# Patient Record
Sex: MALE | Race: OTHER | HISPANIC OR LATINO | Employment: OTHER | URBAN - METROPOLITAN AREA
[De-identification: names, ages, dates, MRNs, and addresses within clinical notes are randomized per-mention and may not be internally consistent; named-entity substitution may affect disease eponyms.]

---

## 2020-10-08 ENCOUNTER — TRANSCRIBE ORDERS (OUTPATIENT)
Dept: ADMINISTRATIVE | Facility: HOSPITAL | Age: 30
End: 2020-10-08

## 2020-10-08 DIAGNOSIS — M85.671 CYST OF BONE OF RIGHT FOOT: Primary | ICD-10-CM

## 2020-10-08 DIAGNOSIS — S96.912A: ICD-10-CM

## 2020-10-13 ENCOUNTER — HOSPITAL ENCOUNTER (OUTPATIENT)
Dept: MRI IMAGING | Facility: HOSPITAL | Age: 30
Discharge: HOME | End: 2020-10-13
Attending: PODIATRIST
Payer: COMMERCIAL

## 2020-10-13 DIAGNOSIS — S96.912A: ICD-10-CM

## 2020-10-13 DIAGNOSIS — M85.671 CYST OF BONE OF RIGHT FOOT: ICD-10-CM

## 2020-10-13 PROCEDURE — 73718 MRI LOWER EXTREMITY W/O DYE: CPT

## 2020-10-13 PROCEDURE — G1004 CDSM NDSC: HCPCS

## 2020-10-27 VITALS — DIASTOLIC BLOOD PRESSURE: 83 MMHG | SYSTOLIC BLOOD PRESSURE: 133 MMHG | HEART RATE: 71 BPM | WEIGHT: 174 LBS

## 2020-10-27 DIAGNOSIS — M85.671 CYST OF BONE OF RIGHT FOOT: ICD-10-CM

## 2020-10-27 DIAGNOSIS — M79.672 PAIN IN LEFT FOOT: ICD-10-CM

## 2020-10-27 DIAGNOSIS — S97.82XA CRUSHING INJURY OF LEFT FOOT, INITIAL ENCOUNTER: ICD-10-CM

## 2020-10-27 DIAGNOSIS — S96.192A: Primary | ICD-10-CM

## 2020-10-27 DIAGNOSIS — S90.32XA HEMATOMA OF LEFT FOOT: ICD-10-CM

## 2020-10-27 PROCEDURE — 99243 OFF/OP CNSLTJ NEW/EST LOW 30: CPT | Performed by: ORTHOPAEDIC SURGERY

## 2021-01-05 ENCOUNTER — OFFICE VISIT (OUTPATIENT)
Dept: OBGYN CLINIC | Facility: CLINIC | Age: 31
End: 2021-01-05
Payer: COMMERCIAL

## 2021-01-05 ENCOUNTER — APPOINTMENT (OUTPATIENT)
Dept: RADIOLOGY | Facility: CLINIC | Age: 31
End: 2021-01-05
Payer: COMMERCIAL

## 2021-01-05 DIAGNOSIS — M25.512 ACUTE PAIN OF LEFT SHOULDER: ICD-10-CM

## 2021-01-05 DIAGNOSIS — M25.512 ACUTE PAIN OF LEFT SHOULDER: Primary | ICD-10-CM

## 2021-01-05 DIAGNOSIS — M75.22 BICEPS TENDINITIS OF LEFT UPPER EXTREMITY: ICD-10-CM

## 2021-01-05 PROCEDURE — 99213 OFFICE O/P EST LOW 20 MIN: CPT | Performed by: ORTHOPAEDIC SURGERY

## 2021-01-05 PROCEDURE — 73030 X-RAY EXAM OF SHOULDER: CPT

## 2021-01-05 RX ORDER — IBUPROFEN 800 MG/1
800 TABLET ORAL EVERY 8 HOURS PRN
Qty: 60 TABLET | Refills: 0 | Status: SHIPPED | OUTPATIENT
Start: 2021-01-05 | End: 2021-01-07

## 2021-01-05 NOTE — PROGRESS NOTES
Patient Name:  Denny Strong  MRN:  [id number]    Assessment & Plan     1  Acute pain of left shoulder  -     XR shoulder 2+ vw left; Future; Expected date: 01/05/2021  -     Ambulatory referral to Physical Therapy; Future  -     ibuprofen (MOTRIN) 800 mg tablet; Take 1 tablet (800 mg total) by mouth every 8 (eight) hours as needed for mild pain or moderate pain 4-5 days using above sig, then take as needed remainder of medication  -     MRI arthrogram left shoulder; Future; Expected date: 01/05/2021    2  Biceps tendinitis of left upper extremity  -     Ambulatory referral to Physical Therapy; Future  -     ibuprofen (MOTRIN) 800 mg tablet; Take 1 tablet (800 mg total) by mouth every 8 (eight) hours as needed for mild pain or moderate pain 4-5 days using above sig, then take as needed remainder of medication  -     MRI arthrogram left shoulder; Future; Expected date: 01/05/2021        Patient has several months of worsening left shoulder pain which is affecting his activities of daily lifting  Pain has worsened despite use of oral anti-inflammatories and activity modification  History and exam are consistent with biceps tendinitis and suggest possible SLAP tear  I have ordered an MRI arthrogram of the left shoulder to evaluate possible labral pathology due to the persistent worsening of his symptoms  I have also given him a prescription for ibuprofen and have recommend icing of the left shoulder and continued home exercises working on range of motion and strengthening  He should follow up in the office after the MRI for discussion of the results  Chief Complaint     Left shoulder pain    History of the Present Illness     Denny Strong is a 35 y o  male with left shoulder pain that started about 3 months ago  He is right-hand dominant    He states that 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 at which time he lost balance in the harness tugged on his arm putting it in an extended position  He began to have anterior shoulder pain after that  He has taken over-the-counter Motrin for the past several months with minimal relief  He complains of worsening pain with supination and forward flexion activities  He denies any discrete instability  He does report intermittent clicking in the shoulder which is painful  He states his pain has been worsening despite activity modification and oral analgesics and is affecting his activities of daily living  He denies any numbness or tingling  He is beginning a job as a  this Monday  No other new complaints  Review of Systems     Review of Systems   Constitutional: Negative for appetite change and unexpected weight change  HENT: Negative for congestion and trouble swallowing  Eyes: Negative for visual disturbance  Respiratory: Negative for cough and shortness of breath  Cardiovascular: Negative for chest pain and palpitations  Gastrointestinal: Negative for nausea and vomiting  Endocrine: Negative for cold intolerance and heat intolerance  Musculoskeletal: Negative for gait problem and myalgias  Skin: Negative for rash  Neurological: Negative for numbness  Physical Exam     There were no vitals taken for this visit  Active range of motion to 170 degrees forward flexion, 170 degrees abduction, 80 degrees external rotation, and internal rotation to T12  There is significant tenderness present over the proximal biceps tendon  Empty can testing is negative  There is normal strength with external rotation testing at the side  Belly press test is negative  Freedman test is negative  Darby's test is equivocal   Speed's test is positive  There is a negative cross-arm adduction test   There is pain with dynamic shear testing     No apprehension present with abduction and external rotation to 90°    Patient has guarding with load and shift maneuver though there is no discrete instability appreciated  The patient is neurovascularly intact distally in the extremity  Eyes:  Anicteric sclerae  Neck:  Supple  Lungs:  Normal respiratory effort  Cardiovascular:  Capillary refill is less than 2 seconds  Skin:  Intact without erythema  Neurologic:  Sensation grossly intact to light touch  Psychiatric:  Mood and affect are appropriate  Data Review     I have personally reviewed pertinent films in PACS, and my interpretation follows:    X-rays left shoulder show no fracture dislocation  Glenohumeral joint space is well maintained  No past medical history on file  No past surgical history on file  No Known Allergies    No current outpatient medications on file prior to visit  No current facility-administered medications on file prior to visit  Social History     Tobacco Use    Smoking status: Never Smoker    Smokeless tobacco: Never Used   Substance Use Topics    Alcohol use: Not on file    Drug use: Not on file       No family history on file            Procedures Performed     Procedures        Ernesto Benjamin DO

## 2021-01-06 ENCOUNTER — TRANSCRIBE ORDERS (OUTPATIENT)
Dept: ADMINISTRATIVE | Facility: HOSPITAL | Age: 31
End: 2021-01-06

## 2021-01-06 DIAGNOSIS — M75.22 BICIPITAL TENDINITIS, LEFT SHOULDER: Primary | ICD-10-CM

## 2021-01-06 DIAGNOSIS — M25.512 PAIN IN LEFT SHOULDER: ICD-10-CM

## 2021-01-07 ENCOUNTER — OFFICE VISIT (OUTPATIENT)
Dept: OBGYN CLINIC | Facility: CLINIC | Age: 31
End: 2021-01-07
Payer: COMMERCIAL

## 2021-01-07 VITALS
DIASTOLIC BLOOD PRESSURE: 95 MMHG | SYSTOLIC BLOOD PRESSURE: 145 MMHG | HEIGHT: 69 IN | WEIGHT: 181 LBS | HEART RATE: 74 BPM | BODY MASS INDEX: 26.81 KG/M2

## 2021-01-07 DIAGNOSIS — M79.645 FINGER PAIN, LEFT: Primary | ICD-10-CM

## 2021-01-07 PROCEDURE — 20600 DRAIN/INJ JOINT/BURSA W/O US: CPT | Performed by: ORTHOPAEDIC SURGERY

## 2021-01-07 PROCEDURE — 99213 OFFICE O/P EST LOW 20 MIN: CPT | Performed by: ORTHOPAEDIC SURGERY

## 2021-01-07 NOTE — PROGRESS NOTES
CHIEF COMPLAINT:  Chief Complaint   Patient presents with    Left Index Finger - Pain, Clicking, Locking       SUBJECTIVE:  Rani Iqbal is a 35y o  year old  male who presents to the office for evaluation of his left index finger  Pt states that he has been having pain at the RCL at the MCP joint of his left index finger for about 3 weeks  Pt states that he has applied ice without relief  PAST MEDICAL HISTORY:  History reviewed  No pertinent past medical history  PAST SURGICAL HISTORY:  History reviewed  No pertinent surgical history  FAMILY HISTORY:  History reviewed  No pertinent family history  SOCIAL HISTORY:  Social History     Tobacco Use    Smoking status: Never Smoker    Smokeless tobacco: Never Used   Substance Use Topics    Alcohol use: Not on file    Drug use: Not on file       MEDICATIONS:    Current Outpatient Medications:     Acetaminophen (TYLENOL PO), Take by mouth, Disp: , Rfl:     ALLERGIES:  No Known Allergies    REVIEW OF SYSTEMS:  Review of Systems   Constitutional: Negative for chills, fever and unexpected weight change  HENT: Negative for hearing loss, nosebleeds and sore throat  Eyes: Negative for pain, redness and visual disturbance  Respiratory: Negative for cough, shortness of breath and wheezing  Cardiovascular: Negative for chest pain, palpitations and leg swelling  Gastrointestinal: Negative for abdominal pain, nausea and vomiting  Endocrine: Negative for polydipsia and polyuria  Genitourinary: Negative for dysuria and hematuria  Skin: Negative for rash and wound  Neurological: Negative for dizziness, light-headedness and headaches  Psychiatric/Behavioral: Negative for decreased concentration, dysphoric mood and suicidal ideas  The patient is not nervous/anxious          VITALS:  Vitals:    01/07/21 0821   BP: 145/95   Pulse: 74       LABS:  HgA1c: No results found for: HGBA1C  BMP: No results found for: GLUCOSE, CALCIUM, NA, K, CO2, CL, BUN, CREATININE    _____________________________________________________  PHYSICAL EXAMINATION:  General: well developed and well nourished, alert, oriented times 3 and appears comfortable  Psychiatric: Normal  HEENT: Trachea Midline, No torticollis  Pulmonary: No audible wheezing or strider  Cardiovascular: No discernable arrhythmia   Skin: No masses, erythema, lacerations, fluctation, ulcerations  Neurovascular: Sensation Intact to the Median, Ulnar, Radial Nerve, Motor Intact to the Median, Ulnar, Radial Nerve and Pulses Intact    MUSCULOSKELETAL EXAMINATION:  Left index finger   Tender palpation over the radial collateral ligament  Pain with UCL stressing  No laxity with RCL and UCL testing        ___________________________________________________  STUDIES REVIEWED:  No studies reviewed  PROCEDURES PERFORMED:  Small joint arthrocentesis: L index MCP  Universal Protocol:  Consent: Verbal consent obtained  Risks and benefits: risks, benefits and alternatives were discussed  Consent given by: patient  Patient understanding: patient states understanding of the procedure being performed  Patient consent: the patient's understanding of the procedure matches consent given  Site marked: the operative site was marked  Supporting Documentation  Indications: pain   Procedure Details  Location: index finger - L index MCP (RCL origin )  Preparation: Patient was prepped and draped in the usual sterile fashion  Ultrasound guidance: no              _____________________________________________________  ASSESSMENT/PLAN:    Left index finger capsulitis   * CSI was offered and accepted   * CSI was administered at the RCL origin without complications  * Pt will follow up in the office in 2 weeks for reevaluation         Follow Up:  No follow-ups on file        To Do Next Visit:  Re-evaluation of current issue          Scribe Attestation    I,:  Lillian Dailey am acting as a scribe while in the presence of the attending physician :       I,:  Starla Tavares MD personally performed the services described in this documentation    as scribed in my presence :

## 2021-01-18 ENCOUNTER — EVALUATION (OUTPATIENT)
Dept: PHYSICAL THERAPY | Facility: CLINIC | Age: 31
End: 2021-01-18
Payer: COMMERCIAL

## 2021-01-18 DIAGNOSIS — M75.22 BICEPS TENDINITIS OF LEFT UPPER EXTREMITY: ICD-10-CM

## 2021-01-18 DIAGNOSIS — M25.512 ACUTE PAIN OF LEFT SHOULDER: ICD-10-CM

## 2021-01-18 PROCEDURE — 97535 SELF CARE MNGMENT TRAINING: CPT

## 2021-01-18 PROCEDURE — 97162 PT EVAL MOD COMPLEX 30 MIN: CPT

## 2021-01-18 NOTE — PROGRESS NOTES
PT Evaluation     Today's date: 2021  Patient name: Cecilio Stapleton  : 1990  MRN: [id number]  Referring provider: Geovanna Hurst PA-C  Dx:   Encounter Diagnosis     ICD-10-CM    1  Acute pain of left shoulder  M25 512 Ambulatory referral to Physical Therapy   2  Biceps tendinitis of left upper extremity  M75 22 Ambulatory referral to Physical Therapy                  Assessment  Assessment details: Pt presents following injury to left shoulder a few months ago  Reports continued pain with lifting and reaching above shoulder level  Pain with resisted bicep movements noted deep in shoulder  Reports decreased symptoms with relocation test   Reports he feels better with support/pressure anterior shoulder region  Intermittent cracking noted in shoulder with movement  PT notes limited ROM due to symptoms and decrease in shoulder strength  Pt has MRI ordered  Pt would benefit from PT tx to decrease symptoms and restore normal functional level  Impairments: abnormal or restricted ROM, activity intolerance, impaired physical strength, lacks appropriate home exercise program and pain with function     Prognosis: good    Goals  ST  Decrease pain 50% 6 wk  2  Increase shoulder AROM to WNL 6 wk  3  Increase shoulder strength to 4+/5 6 wk  4  Pt will report no difficulty with activity below shoulder level 6 wk  LT  Pt will report no pain 12 wk  2  Increase shoulder strength to WNL 12 wk  3  Pt will report no limitations with ADL's 8 wk  4    Pt will return to normal work duties with no limitations 12 wk    Plan  Patient would benefit from: PT eval and skilled physical therapy  Planned modality interventions: cryotherapy and thermotherapy: hydrocollator packs  Planned therapy interventions: joint mobilization, manual therapy, strengthening, stretching, therapeutic activities, therapeutic exercise, flexibility, functional ROM exercises and home exercise program  Frequency: 3x week  Duration in weeks: 12  Treatment plan discussed with: patient          Subjective Evaluation    History of Present Illness  Mechanism of injury: Pt presents with reports of left shoulder pain after injury a few months ago  Pt wearing harness working on roof  Reports losing balance and harness forced shoulder in awkward position  Feels shoulder may have dislocated  Went to ER  Reports continued pain since injury  Reports difficulty reaching above shoulder level  Difficulty lifting objects  Denies altered sensation LUE  Pain primarily with lifting and reaching above shoulder level  Reports cracking in shoulder with movement at times  Pain  Current pain ratin  At best pain ratin  At worst pain ratin  Location: Left shoulder  Quality: sharp  Progression: no change    Hand dominance: right  Life stress: Works construction    Patient Goals  Patient goals for therapy: decreased pain, increased strength, increased motion and return to work          Objective     Tenderness     Left Shoulder   Tenderness in the biceps tendon (proximal) and bicipital groove  Active Range of Motion   Left Shoulder   Flexion: 170 degrees   External rotation BTH: C5   Internal rotation BTB: L5     Additional Active Range of Motion Details  Pain elevating UE above shoulder level     Passive Range of Motion   Left Shoulder   Flexion: 160 degrees with pain  Abduction: 160 degrees with pain  External rotation 90°: 30 degrees with pain  Internal rotation 90°: 48 degrees with pain    Additional Passive Range of Motion Details  Pain noted at end ranges    Strength/Myotome Testing     Left Shoulder     Planes of Motion   Flexion: 4   Abduction: 4   External rotation at 0°: 4   Internal rotation at 0°: 4     Isolated Muscles   Biceps: 4-   Triceps: 4     Right Shoulder   Normal muscle strength    Tests     Left Shoulder   Positive Speed's and relocation     Negative anterior load and shift, drop arm, empty can, full can, Hawkin's and posterior load and shift               Precautions:   N/A       Manuals 1-18-21                                       Neuro Re-Ed                                                                 Ther Ex        UBE alt        Isometrics all planes        LTP/MTP        t-band ER/IR        t-band Add        shrug        Bicep curl        Prone shld flex/abd/ext        sidelying ER        Wall slide                                        Ther Activity                        Gait Training                        Modalities        CP

## 2021-02-04 NOTE — PROGRESS NOTES
PT DISCHARGE     Today's date: 2021  Patient name: Mandi Lam  : 1990  MRN: [id number]  Referring provider: Chip Pastor PA-C  Dx:   Encounter Diagnosis     ICD-10-CM    1  Acute pain of left shoulder  M25 512 Ambulatory referral to Physical Therapy     PT plan of care cert/re-cert   2  Biceps tendinitis of left upper extremity  M75 22 Ambulatory referral to Physical Therapy     PT plan of care cert/re-cert       Start Time: 1600  Stop Time: 1645  Total time in clinic (min): 45 minutes    Assessment  Assessment details: Pt presented following injury to left shoulder a few months ago  Reported continued pain with lifting and reaching above shoulder level  Pain with resisted bicep movements noted deep in shoulder  Pt attended initial evaluation only on 21  He did not return for further tx after that time  D/C PT services  Impairments: abnormal or restricted ROM, activity intolerance, impaired physical strength, lacks appropriate home exercise program and pain with function     Prognosis: good    Goals  ST  Decrease pain 50% 6 wk  2  Increase shoulder AROM to WNL 6 wk  3  Increase shoulder strength to 4+/5 6 wk  4  Pt will report no difficulty with activity below shoulder level 6 wk  (not met)  LT  Pt will report no pain 12 wk  2  Increase shoulder strength to WNL 12 wk  3  Pt will report no limitations with ADL's 8 wk  4    Pt will return to normal work duties with no limitations 12 wk  (not met)    Plan  Plan details: D/C PT services  Findings per initial evaluation   Patient would benefit from: PT eval and skilled physical therapy  Planned modality interventions: cryotherapy and thermotherapy: hydrocollator packs  Planned therapy interventions: joint mobilization, manual therapy, strengthening, stretching, therapeutic activities, therapeutic exercise, flexibility, functional ROM exercises and home exercise program          Subjective Evaluation    History of Present Illness  Mechanism of injury: Pt presents with reports of left shoulder pain after injury a few months ago  Pt wearing harness working on roof  Reports losing balance and harness forced shoulder in awkward position  Feels shoulder may have dislocated  Went to ER  Reports continued pain since injury  Reports difficulty reaching above shoulder level  Difficulty lifting objects  Denies altered sensation LUE  Pain primarily with lifting and reaching above shoulder level  Reports cracking in shoulder with movement at times  Pain  Current pain ratin  At best pain ratin  At worst pain ratin  Location: Left shoulder  Quality: sharp  Progression: no change    Hand dominance: right  Life stress: Works construction    Patient Goals  Patient goals for therapy: decreased pain, increased strength, increased motion and return to work          Objective     Tenderness     Left Shoulder   Tenderness in the biceps tendon (proximal) and bicipital groove  Active Range of Motion   Left Shoulder   Flexion: 170 degrees   External rotation BTH: C5   Internal rotation BTB: L5     Additional Active Range of Motion Details  Pain elevating UE above shoulder level     Passive Range of Motion   Left Shoulder   Flexion: 160 degrees with pain  Abduction: 160 degrees with pain  External rotation 90°: 30 degrees with pain  Internal rotation 90°: 48 degrees with pain    Additional Passive Range of Motion Details  Pain noted at end ranges    Strength/Myotome Testing     Left Shoulder     Planes of Motion   Flexion: 4   Abduction: 4   External rotation at 0°: 4   Internal rotation at 0°: 4     Isolated Muscles   Biceps: 4-   Triceps: 4     Right Shoulder   Normal muscle strength    Tests     Left Shoulder   Positive Speed's and relocation  Negative anterior load and shift, drop arm, empty can, full can, Hawkin's and posterior load and shift         Flowsheet Rows      Most Recent Value   PT/OT G-Codes   Current Score  57 Projected Score  77

## 2021-02-09 NOTE — NURSING NOTE
Call placed to patient to discuss upcoming appointment at [place] [address] radiology department and complete consultation with patient  Unable to leave message, call his work number and wife returned call  Patient is having MRI arthrogram of left shoulder  Requested  to call back to but informed me that she is  aware of location and time expected for procedure, upon asking if she had any question she responded  no

## 2021-02-15 ENCOUNTER — HOSPITAL ENCOUNTER (OUTPATIENT)
Dept: RADIOLOGY | Facility: HOSPITAL | Age: 31
Discharge: HOME | End: 2021-02-15

## 2021-08-21 ENCOUNTER — APPOINTMENT (EMERGENCY)
Dept: RADIOLOGY | Facility: HOSPITAL | Age: 31
End: 2021-08-21
Payer: COMMERCIAL

## 2021-08-21 ENCOUNTER — HOSPITAL ENCOUNTER (EMERGENCY)
Facility: HOSPITAL | Age: 31
Discharge: HOME | End: 2021-08-21
Attending: EMERGENCY MEDICINE | Admitting: EMERGENCY MEDICINE
Payer: COMMERCIAL

## 2021-08-21 VITALS
BODY MASS INDEX: 26.73 KG/M2 | RESPIRATION RATE: 19 BRPM | DIASTOLIC BLOOD PRESSURE: 79 MMHG | WEIGHT: 181 LBS | SYSTOLIC BLOOD PRESSURE: 128 MMHG | TEMPERATURE: 98 F | HEART RATE: 72 BPM | OXYGEN SATURATION: 98 %

## 2021-08-21 DIAGNOSIS — R19.7 DIARRHEA: ICD-10-CM

## 2021-08-21 DIAGNOSIS — S93.402A LEFT ANKLE SPRAIN: Primary | ICD-10-CM

## 2021-08-21 LAB
ALBUMIN SERPL BCP-MCNC: 4.6 G/DL (ref 3.5–5)
ALP SERPL-CCNC: 64 U/L (ref 46–116)
ALT SERPL W P-5'-P-CCNC: 35 U/L (ref 12–78)
ANION GAP SERPL CALCULATED.3IONS-SCNC: 7 MMOL/L (ref 4–13)
AST SERPL W P-5'-P-CCNC: 19 U/L (ref 5–45)
BASOPHILS # BLD AUTO: 0.05 THOUSANDS/ΜL (ref 0–0.1)
BASOPHILS NFR BLD AUTO: 1 % (ref 0–1)
BILIRUB DIRECT SERPL-MCNC: 0.12 MG/DL (ref 0–0.2)
BILIRUB SERPL-MCNC: 0.43 MG/DL (ref 0.2–1)
BUN SERPL-MCNC: 10 MG/DL (ref 5–25)
CALCIUM SERPL-MCNC: 8.6 MG/DL (ref 8.3–10.1)
CHLORIDE SERPL-SCNC: 104 MMOL/L (ref 100–108)
CO2 SERPL-SCNC: 30 MMOL/L (ref 21–32)
CREAT SERPL-MCNC: 1.02 MG/DL (ref 0.6–1.3)
EOSINOPHIL # BLD AUTO: 0.21 THOUSAND/ΜL (ref 0–0.61)
EOSINOPHIL NFR BLD AUTO: 3 % (ref 0–6)
ERYTHROCYTE [DISTWIDTH] IN BLOOD BY AUTOMATED COUNT: 12.7 % (ref 11.6–15.1)
GFR SERPL CREATININE-BSD FRML MDRD: 99 ML/MIN/1.73SQ M
GLUCOSE SERPL-MCNC: 90 MG/DL (ref 65–140)
HCT VFR BLD AUTO: 47.7 % (ref 36.5–49.3)
HGB BLD-MCNC: 15.6 G/DL (ref 12–17)
IMM GRANULOCYTES # BLD AUTO: 0.02 THOUSAND/UL (ref 0–0.2)
IMM GRANULOCYTES NFR BLD AUTO: 0 % (ref 0–2)
LIPASE SERPL-CCNC: 130 U/L (ref 73–393)
LYMPHOCYTES # BLD AUTO: 2.5 THOUSANDS/ΜL (ref 0.6–4.47)
LYMPHOCYTES NFR BLD AUTO: 40 % (ref 14–44)
MAGNESIUM SERPL-MCNC: 2.1 MG/DL (ref 1.6–2.6)
MCH RBC QN AUTO: 30.8 PG (ref 26.8–34.3)
MCHC RBC AUTO-ENTMCNC: 32.7 G/DL (ref 31.4–37.4)
MCV RBC AUTO: 94 FL (ref 82–98)
MONOCYTES # BLD AUTO: 0.53 THOUSAND/ΜL (ref 0.17–1.22)
MONOCYTES NFR BLD AUTO: 9 % (ref 4–12)
NEUTROPHILS # BLD AUTO: 2.92 THOUSANDS/ΜL (ref 1.85–7.62)
NEUTS SEG NFR BLD AUTO: 47 % (ref 43–75)
NRBC BLD AUTO-RTO: 0 /100 WBCS
PLATELET # BLD AUTO: 182 THOUSANDS/UL (ref 149–390)
PMV BLD AUTO: 11.1 FL (ref 8.9–12.7)
POTASSIUM SERPL-SCNC: 4.1 MMOL/L (ref 3.5–5.3)
PROT SERPL-MCNC: 8.2 G/DL (ref 6.4–8.2)
RBC # BLD AUTO: 5.06 MILLION/UL (ref 3.88–5.62)
SARS-COV-2 RNA RESP QL NAA+PROBE: NEGATIVE
SODIUM SERPL-SCNC: 141 MMOL/L (ref 136–145)
WBC # BLD AUTO: 6.23 THOUSAND/UL (ref 4.31–10.16)

## 2021-08-21 PROCEDURE — 73610 X-RAY EXAM OF ANKLE: CPT

## 2021-08-21 PROCEDURE — 96374 THER/PROPH/DIAG INJ IV PUSH: CPT

## 2021-08-21 PROCEDURE — 83735 ASSAY OF MAGNESIUM: CPT | Performed by: EMERGENCY MEDICINE

## 2021-08-21 PROCEDURE — 80048 BASIC METABOLIC PNL TOTAL CA: CPT | Performed by: EMERGENCY MEDICINE

## 2021-08-21 PROCEDURE — 73630 X-RAY EXAM OF FOOT: CPT

## 2021-08-21 PROCEDURE — 99284 EMERGENCY DEPT VISIT MOD MDM: CPT

## 2021-08-21 PROCEDURE — 99285 EMERGENCY DEPT VISIT HI MDM: CPT | Performed by: EMERGENCY MEDICINE

## 2021-08-21 PROCEDURE — U0005 INFEC AGEN DETEC AMPLI PROBE: HCPCS | Performed by: EMERGENCY MEDICINE

## 2021-08-21 PROCEDURE — U0003 INFECTIOUS AGENT DETECTION BY NUCLEIC ACID (DNA OR RNA); SEVERE ACUTE RESPIRATORY SYNDROME CORONAVIRUS 2 (SARS-COV-2) (CORONAVIRUS DISEASE [COVID-19]), AMPLIFIED PROBE TECHNIQUE, MAKING USE OF HIGH THROUGHPUT TECHNOLOGIES AS DESCRIBED BY CMS-2020-01-R: HCPCS | Performed by: EMERGENCY MEDICINE

## 2021-08-21 PROCEDURE — 36415 COLL VENOUS BLD VENIPUNCTURE: CPT | Performed by: EMERGENCY MEDICINE

## 2021-08-21 PROCEDURE — 85025 COMPLETE CBC W/AUTO DIFF WBC: CPT | Performed by: EMERGENCY MEDICINE

## 2021-08-21 PROCEDURE — 83690 ASSAY OF LIPASE: CPT | Performed by: EMERGENCY MEDICINE

## 2021-08-21 PROCEDURE — 80076 HEPATIC FUNCTION PANEL: CPT | Performed by: EMERGENCY MEDICINE

## 2021-08-21 PROCEDURE — 96361 HYDRATE IV INFUSION ADD-ON: CPT

## 2021-08-21 RX ORDER — IBUPROFEN 600 MG/1
600 TABLET ORAL EVERY 6 HOURS PRN
Qty: 30 TABLET | Refills: 0 | Status: SHIPPED | OUTPATIENT
Start: 2021-08-21

## 2021-08-21 RX ORDER — KETOROLAC TROMETHAMINE 30 MG/ML
15 INJECTION, SOLUTION INTRAMUSCULAR; INTRAVENOUS ONCE
Status: COMPLETED | OUTPATIENT
Start: 2021-08-21 | End: 2021-08-21

## 2021-08-21 RX ORDER — LOPERAMIDE HYDROCHLORIDE 2 MG/1
2 CAPSULE ORAL EVERY 12 HOURS PRN
Qty: 12 CAPSULE | Refills: 0 | Status: SHIPPED | OUTPATIENT
Start: 2021-08-21 | End: 2021-09-16

## 2021-08-21 RX ADMIN — KETOROLAC TROMETHAMINE 15 MG: 30 INJECTION, SOLUTION INTRAMUSCULAR; INTRAVENOUS at 12:48

## 2021-08-21 RX ADMIN — SODIUM CHLORIDE 1000 ML: 0.9 INJECTION, SOLUTION INTRAVENOUS at 12:48

## 2021-08-21 NOTE — ED NOTES
Pt found to not be in his stretcher, assumed pt went to bathroom  Bathroom empty  Provider to bedside looking for pt at this time  Presumed pt eloped ED       Dominique Kahn RN  08/21/21 5935

## 2021-08-21 NOTE — ED NOTES
Pt asking to go outside  Pt informed he has an IV with fluids running and he may not go outside  Pt acknowledged        Cinthya Montanez RN  08/21/21 0735

## 2021-08-21 NOTE — DISCHARGE INSTRUCTIONS
Diarrea aguda   LO QUE NECESITA SABER:   La diarrea aguda comienza rápidamente y dura poco tiempo, usualmente de 1 a 3 días  Puede durar hasta 2 semanas  Es posible que usted no pueda controlar love diarrea  La diarrea aguda por lo general se detiene por sí kurt  INSTRUCCIONES SOBRE EL [facility] HOSPITALARIA:   Regrese a la adarsh de emergencias si:  · Se siente confundido  · Love ritmo cardíaco es más rápido de lo normal     · Katharina ojos se craig demasiado hundidos o no le salen lágrimas cuando llora  · Usted orina menos de lo normal o love orina es de color amarillo oscuro  · Usted tiene michelle o mucosidad en katharina movimientos intestinales  · Usted tiene dolor abdominal intenso  · Usted no puede corrina ningún líquido  Comuníquese con love médico si:  · Katharina síntomas no mejoran con el tratamiento  · Usted tiene fiebre que supera los 101 3°F (38 5°C)  · Tiene dificultad para ingerir alimentos y líquidos porque tiene vómitos  · Love diarrea no mejora dentro de 7 días  · Usted tiene preguntas o inquietudes acerca de love condición o cuidado  Acuda a katharina consultas de control con love médico según le indicaron  Anote katharina preguntas para que se acuerde de hacerlas boaz katharina visitas  Medicamentos:  · El medicamento para la diarrea es un medicamento de venta marimar que ayuda a disminuir o a detener love diarrea  No tome lindsey medicamento a menos que love médico se lo indique  · Los antibióticos podrían ser administrados para tratar amanda infección causada por bacterias  · Antiparasitarios podrían ser administrados para tratar amanda infección causada por parásitos  · [place] katharina medicamentos gomez se le haya indicado  Consulte con love médico si usted bhavana que love medicamento no le está ayudando o si presenta efectos secundarios  Infórmele si es alérgico a algún medicamento  Mantenga amanda lista actualizada de los [place], las vitaminas y los productos herbales que alva   Incluya los [employer] medicamentos: cantidad, frecuencia y motivo de administración  Traiga con usted la lista o los envases de las píldoras a nga citas de seguimiento  Lleve la lista de los medicamentos con usted en kay de amanda emergencia  Cuidados personales:  · [place] líquidos gomez se le haya indicado  Los líquidos evitarán la deshidratación que es provocada por la diarrea  Pregunte a love médico sobre la cantidad de líquido que necesita corrina todos los días y cuáles le recomienda  Es posible que necesite corrina amanda solución de rehidratación oral (SRO)  [address] cantidades exactas de agua, sal y azúcar que usted necesita para reemplazar los líquidos corporales  Se pueden comprar soluciones de rehidratación oral en la mayoría de los supermercados y [place]  · Coma alimentos que beatrice fáciles de digerir  Algunos ejemplos incluyen arroz, lentejas, cereales, plátanos, patatas y pan  También se incluyen algunas frutas (plátano, melón), verduras leonor cocidas y sarah [place]  No coma alimentos ricos en [place] y azúcar  No alvaro alcohol hasta que la diarrea haya desaparecido  Prevenga la diarrea aguda:  · Lávese las mohinder frecuentemente  Utilice agua y [place]  Lávese las mohinder antes de comer o preparar alimentos  También lávese nga mohinder después del ir al baño  Utilice alcohol en gel si no tiene [place] y [place]  · [address] superficies del baño limpias  [place] ayuda a evitar la propagación de gérmenes que provocan la diarrea aguda  · [address] frutas y verduras antes de consumirlas  [place] puede ayudar a [place] gérmenes causantes de diarrea  Si es posible, retire la piel de frutas y verduras o cocínelas leonor antes de comerlas  · [address] y las aves de krueger gomez se indica  La carne incluye la carne de res y [address]  Las aves de krueger [employer], Patricio y katya  ? Cocine la carne picada  a 160 °F     ?  Cocine la carne de ave picada, la carne de ave entera o los guillermo de carne de [place]  a 165 °F gomez mínimo  Retire la carne del helder  Deje reposar boaz 3 minutos antes de comerla  ? Cocine los guillermo enteros de carne que no sea de ave  a 145 °F gomez mínimo  Retire la carne del helder  Deje reposar boaz 3 minutos antes de comerla  · [employer] platos que tocaron la carne cruda con [place] con jabón  [place] incluye tablas de cortar, utensilios, platos y recipientes  · Coloque la carne cruda o cocida en el refrigerador tan pronto gomez sea posible  Las bacterias pueden crecer en la carne que permanece a temperatura ambiente boaz [place]  · No coma ostras, almejas o mejillones crudos o poco cocidos  Estos alimentos pueden estar contaminados y causar infección  · Chelo solamente agua filtrada o tratada solo cuando viaja  No ponga hielo en nga bebidas  Chelo agua embotellada siempre que sea posible  © Copyright [employer]UI[employer] 2021 Information is for End User's use only and may not be sold, redistributed or otherwise used for commercial purposes  All illustrations and images included in CareNotes® are the copyrighted property of [employer] , [facility]  or [address] [place] [address] es sólo para uso en educación  Love intención no es darle un consejo médico sobre enfermedades o tratamientos  Colsulte con love [place] Domonique farmacéutico antes de seguir cualquier régimen médico para saber si es seguro y efectivo para usted  Esguince de tobillo   LO QUE NECESITA SABER:   Un esguince o torcedura de tobillo ocurre cuando 1 o más ligamentos en la articulación del tobillo se estiran o desgarran  Los ligamentos son tejidos resistentes que [employer]  Los ligamentos sostienen las articulaciones y [employer] huesos en love lugar  INSTRUCCIONES SOBRE EL YOVANNY HOSPITALARIA:   Busque atención médica de inmediato si:  · Usted tiene dolor intenso en el tobillo  · Love pie o dedos del pie están fríos o entumecidos      · Love tobillo se pone más débil o inestable (tambaleante)  · Usted no puede apoyarse sobre el tobillo o pie  · Love inflamación ha aumentado o regresado  Llame a love médico si:  · El dolor no desaparece, incluso después del tratamiento  · Usted tiene preguntas o inquietudes acerca de love condición o cuidado  Medicamentos: Es posible que usted necesite alguno de los siguientes:  · [place], gomez el ibuprofeno, [place] [facility] a disminuir la inflamación, el dolor y la [place]  Heather medicamento está disponible con o sin amanda receta médica  Los NEDRA pueden causar sangrado estomacal o problemas renales en ciertas personas  Si usted alva un medicamento anticoagulante, siempre pregúntele a love médico si los NEDRA son seguros para usted  Siempre betzaida la etiqueta de heather medicamento y [place] instrucciones  · Acetaminofén minerva el dolor y baja la fiebre  Está disponible sin receta médica  Pregunte la cantidad y la frecuencia con que debe tomarlos  [address]  Betzaida las etiquetas de todos los demás medicamentos que esté usando para saber si también contienen acetaminofén, o pregunte a love médico o farmacéutico  El acetaminofén puede causar daño en el hígado cuando no se alva de forma correcta  No use más de 4 gramos (4000 miligramos) en total de acetaminofeno en un día  · Puede administrarse podrían administrarse  Pregunte al médico cómo debe corrina heather medicamento de forma patrick  Algunos medicamentos recetados para el dolor contienen acetaminofén  No tome otros medicamentos que contengan acetaminofén sin consultarlo con love médico  Demasiado acetaminofeno puede causar daño al hígado  Los medicamentos recetados para el dolor podrían causar estreñimiento  Pregunte a love médico gomez prevenir o tratar estreñimiento  · [place] nga medicamentos gomez se le haya indicado  Consulte con love médico si usted bhavana que love medicamento no le está ayudando o si presenta efectos secundarios  Infórmele si es alérgico a cualquier medicamento   Mantenga amanda lista actualizada de los medicamentos, las vitaminas y los productos herbales que alva  Incluya los siguientes datos de los medicamentos: cantidad, frecuencia y motivo de administración  Traiga con usted la lista o los envases de las píldoras a nga citas de seguimiento  Lleve la lista de los medicamentos con usted en kay de amanda emergencia  Cuidados personales:  · Use aparatos ortopédicos, gomez amanda ortesis, un yeso o amanda férula, para limitar el movimiento y proteger la articulación  Es posible que necesite usar muletas para disminuir el dolor cuando se desplace de un lado a otro  · Vaya a terapia física según indicaciones  Un fisioterapeuta le puede enseñar ejercicios para ayudarle a mejorar el movimiento y la fuerza, y para disminuir el dolor  · Descanse love tobillo para que pueda sanar  Regrese a nga actividades cotidianas según las indicaciones  · Aplique hielo sobre el tobillo de 15 a 20 minutos cada hora o gomez se le indique  Use amanda compresa de hielo o ponga hielo triturado en amanda bolsa de plástico  Marlise Kinnier con amanda toalla  El hielo ayuda a evitar daño al tejido y a disminuir la inflamación y el dolor  · Compresión love tobillo  Pregunte si debería envolver un vendaje elástico alrededor del ligamento lesionado  La venda elástica le proporciona soporte y ayuda a disminuir la inflamación y el movimiento para que la articulación se recupere  Úselo por el tiempo indicado  · Eleve love tobillo por encima del nivel de love corazón con la mayor frecuencia posible  [place] va a disminuir inflamación y el dolor  Apoye el tobillo sobre almohadas o cobijas para mantenerlo elevado cómodamente  Evite otra torcedura de tobillo:  · Permita que el tobillo se recupere  Infórmese del tiempo que el ligamento necesita para sanar  No fernando ninguna actividad física hasta que el médico lo autorice  Si usted [employer], podría desarrollar amanda lesión más grave      · Siempre fernando ejercicios de calentamiento y estiramiento antes de hacer ejercicio o practicar un deporte  · Use equipo adecuado  Siempre use el calzado que le quede leonor y esté hecho para la actividad que está practicando  También podría necesitar apoyo para los tobillos, almohadillas para los codos y rodillas o abrazaderas  Acuda a la consulta de control con love médico según las indicaciones: Anote nga preguntas para que se acuerde de hacerlas boaz nga visitas  © Copyright [employer] 2021 Information is for End User's use only and may not be sold, redistributed or otherwise used for commercial purposes  All illustrations and images included in CareNotes® are the copyrighted property of A D A M , Inc  or [address] [place] [address] es sólo para uso en educación  Love intención no es darle un consejo médico sobre enfermedades o tratamientos  Colsulte con love Stormy Binder farmacéutico antes de seguir cualquier régimen médico para saber si es seguro y efectivo para usted

## 2021-08-21 NOTE — ED NOTES
Pt returned to stretcher  Pt had with 4 visitor, 3 of which were minors  None of which had visitors terry  Pt admitted to standing at ambulance doors to open them and allow family back  Spoke with family and patient about leaving ED  Pts wife stated he "has been here too long and no one saw him yet " RN informed them that the provider was at bedside for eval but pt was unable to be found  pts wife yelling at RN in [facility] that you 'Better get her back here then so she knows he is here  I cant believe he has been here an hour " RN apologized for wait, but explained the ED is very busy and at capacity and patient will be seen as soon as provider is available again and that RN will inform provider  Pts wife left with children  Provide aware       Eric Carreon RN  08/21/21 [id number]

## 2021-08-21 NOTE — ED PROVIDER NOTES
History  Chief Complaint   Patient presents with    Ankle Pain     Patient was lifting weights about 3 weeks ago and has had ankle pain since  L side    Diarrhea     Patient reports diarrhea x 2 weeks     Patient is a 23-year-old male with no significant past medical or surgical history, presents to the emergency department with 2 separate complaints  Patient reports about 3 weeks ago he was carrying a heavy object and twisted his left foot and ankle and then dropped the object onto his left foot  Since then he has been having majority of his pain over the lateral left ankle with associated swelling  He has been able to bear weight but it is causing him pain  Denies any paresthesia or weakness in the leg  Denies any pain or injury above the ankle joint  In addition he reports for the past 2 weeks he has been having daily diarrhea as high as 10 episodes per day  He reports diarrhea is nonbloody  When he takes Pepto-Bismol it does help but when he is not taking the Pepto-Bismol he has worsening diarrhea  He states it started after eating at a restaurant  Denies any other sick contacts from a GI standpoint  Denies any nausea, vomiting, abdominal pain or distention, urinary symptoms, flank pain, headache, dizziness or near syncope, fever, chills, cough, URI symptoms, chest pain, shortness of breath, palpitations, skin rash or color change, focal neurologic deficits  No recent antibiotic use or travel  History provided by:  Patient   used: No        Prior to Admission Medications   Prescriptions Last Dose Informant Patient Reported? Taking? Acetaminophen (TYLENOL PO) Not Taking at Unknown time Self Yes No   Sig: Take by mouth   Patient not taking: Reported on 8/21/2021      Facility-Administered Medications: None       History reviewed  No pertinent past medical history  History reviewed  No pertinent surgical history  History reviewed  No pertinent family history    I have reviewed and agree with the history as documented  E-Cigarette/Vaping    E-Cigarette Use Never User      E-Cigarette/Vaping Substances    Nicotine No     THC No     CBD No     Flavoring No     Other No     Unknown No      Social History     Tobacco Use    Smoking status: Never Smoker    Smokeless tobacco: Never Used   Vaping Use    Vaping Use: Never used   Substance Use Topics    Alcohol use: Not Currently    Drug use: Not Currently       Review of Systems   Constitutional: Negative for chills and fever  HENT: Negative for congestion, ear pain, rhinorrhea and sore throat  Respiratory: Negative for cough, chest tightness, shortness of breath and wheezing  Cardiovascular: Negative for chest pain and palpitations  Gastrointestinal: Positive for diarrhea  Negative for abdominal distention, abdominal pain, blood in stool, constipation, nausea and vomiting  Genitourinary: Negative for dysuria, flank pain, frequency and hematuria  Musculoskeletal: Positive for joint swelling  Negative for back pain, neck pain and neck stiffness  +Left foot and ankle pain and swelling s/p injury  Skin: Negative for color change, pallor, rash and wound  Allergic/Immunologic: Negative for immunocompromised state  Neurological: Negative for dizziness, seizures, syncope, weakness, light-headedness, numbness and headaches  Hematological: Negative for adenopathy  Does not bruise/bleed easily  Psychiatric/Behavioral: Negative for confusion and decreased concentration  All other systems reviewed and are negative  Physical Exam  Physical Exam  Vitals and nursing note reviewed  Constitutional:       General: He is not in acute distress  Appearance: Normal appearance  He is well-developed  He is not ill-appearing, toxic-appearing or diaphoretic  HENT:      Head: Normocephalic and atraumatic        Right Ear: External ear normal       Left Ear: External ear normal       Mouth/Throat: Comments: Orpharyngeal exam deferred at this time due to risk of exposure to COVID-19 during current pandemic  Patient has no oropharyngeal complaints  Eyes:      Extraocular Movements: Extraocular movements intact  Conjunctiva/sclera: Conjunctivae normal    Neck:      Vascular: No JVD  Cardiovascular:      Rate and Rhythm: Normal rate and regular rhythm  Pulses: Normal pulses  Heart sounds: Normal heart sounds  No murmur heard  No friction rub  No gallop  Pulmonary:      Effort: Pulmonary effort is normal  No respiratory distress  Breath sounds: Normal breath sounds  No wheezing, rhonchi or rales  Abdominal:      General: There is no distension  Palpations: Abdomen is soft  Tenderness: There is no abdominal tenderness  There is no guarding or rebound  Musculoskeletal:         General: Swelling and tenderness present  Normal range of motion  Cervical back: Normal range of motion and neck supple  No rigidity  Comments: Left lateral ankle mildly edematous and there is tenderness over the lateral malleolus  No reproducible tenderness over the medial malleolus, posterior ankle or Achilles tendon, bones of the foot, lower leg or knee joint  Full range of motion left ankle and knee joints  Left lower extremity neurovascularly intact  Skin:     General: Skin is warm and dry  Coloration: Skin is not pale  Findings: No erythema or rash  Neurological:      General: No focal deficit present  Mental Status: He is alert and oriented to person, place, and time  Sensory: No sensory deficit  Motor: No weakness     Psychiatric:         Mood and Affect: Mood normal          Behavior: Behavior normal          Vital Signs  ED Triage Vitals [08/21/21 1114]   Temperature Pulse Respirations Blood Pressure SpO2   98 °F (36 7 °C) 72 19 128/79 98 %      Temp Source Heart Rate Source Patient Position - Orthostatic VS BP Location FiO2 (%)   Tympanic -- Sitting Left arm --      Pain Score       7         Vitals:    08/21/21 1114   BP: 128/79   BP Location: Left arm   Pulse: 72   Resp: 19   Temp: 98 °F (36 7 °C)   TempSrc: Tympanic   SpO2: 98%   Weight: 82 1 kg (181 lb)       Visual Acuity      ED Medications  Medications   sodium chloride 0 9 % bolus 1,000 mL (1,000 mL Intravenous New Bag 8/21/21 1248)   ketorolac (TORADOL) injection 15 mg (15 mg Intravenous Given 8/21/21 1248)       Diagnostic Studies  Results Reviewed     Procedure Component Value Units Date/Time    Novel Coronavirus (Covid-19),PCR [facility] - 2 Hour Stat [[id number]]  (Normal) Collected: 08/21/21 1237    Lab Status: Final result Specimen: Nares from Nasopharyngeal Swab Updated: 08/21/21 1404     SARS-CoV-2 Negative    Narrative: The specimen collection materials, transport medium, and/or testing methodology utilized in the production of these test results have been proven to be reliable in a limited validation with an abbreviated program under the Emergency Utilization Authorization provided by the FDA  Testing reported as "Presumptive positive" will be confirmed with secondary testing to ensure result accuracy  Clinical caution and judgement should be used with the interpretation of these results with consideration of the clinical impression and other laboratory testing  Testing reported as "Positive" or "Negative" has been proven to be accurate according to standard laboratory validation requirements  All testing is performed with control materials showing appropriate reactivity at standard intervals        Basic metabolic panel [[id number]] Collected: 08/21/21 1237    Lab Status: Final result Specimen: Blood from Arm, Right Updated: 08/21/21 1304     Sodium 141 mmol/L      Potassium 4 1 mmol/L      Chloride 104 mmol/L      CO2 30 mmol/L      ANION GAP 7 mmol/L      BUN 10 mg/dL      Creatinine 1 02 mg/dL      Glucose 90 mg/dL      Calcium 8 6 mg/dL      eGFR 99 ml/min/1 73sq m     Narrative: National Kidney Disease Foundation guidelines for Chronic Kidney Disease (CKD):     Stage 1 with normal or high GFR (GFR > 90 mL/min/1 73 square meters)    Stage 2 Mild CKD (GFR = 60-89 mL/min/1 73 square meters)    Stage 3A Moderate CKD (GFR = 45-59 mL/min/1 73 square meters)    Stage 3B Moderate CKD (GFR = 30-44 mL/min/1 73 square meters)    Stage 4 Severe CKD (GFR = 15-29 mL/min/1 73 square meters)    Stage 5 End Stage CKD (GFR <15 mL/min/1 73 square meters)  Note: GFR calculation is accurate only with a steady state creatinine    Hepatic function panel [[id number]]  (Normal) Collected: 08/21/21 1237    Lab Status: Final result Specimen: Blood from Arm, Right Updated: 08/21/21 1304     Total Bilirubin 0 43 mg/dL      Bilirubin, Direct 0 12 mg/dL      Alkaline Phosphatase 64 U/L      AST 19 U/L      ALT 35 U/L      Total Protein 8 2 g/dL      Albumin 4 6 g/dL     Magnesium [[id number]]  (Normal) Collected: 08/21/21 1237    Lab Status: Final result Specimen: Blood from Arm, Right Updated: 08/21/21 1304     Magnesium 2 1 mg/dL     Lipase [[id number]]  (Normal) Collected: 08/21/21 1237    Lab Status: Final result Specimen: Blood from Arm, Right Updated: 08/21/21 1304     Lipase 130 u/L     CBC and differential [[id number]] Collected: 08/21/21 1237    Lab Status: Final result Specimen: Blood from Arm, Right Updated: 08/21/21 1245     WBC 6 23 Thousand/uL      RBC 5 06 Million/uL      Hemoglobin 15 6 g/dL      Hematocrit 47 7 %      MCV 94 fL      MCH 30 8 pg      MCHC 32 7 g/dL      RDW 12 7 %      MPV 11 1 fL      Platelets 809 Thousands/uL      nRBC 0 /100 WBCs      Neutrophils Relative 47 %      Immat GRANS % 0 %      Lymphocytes Relative 40 %      Monocytes Relative 9 %      Eosinophils Relative 3 %      Basophils Relative 1 %      Neutrophils Absolute 2 92 Thousands/µL      Immature Grans Absolute 0 02 Thousand/uL      Lymphocytes Absolute 2 50 Thousands/µL      Monocytes Absolute 0 53 Thousand/µL Eosinophils Absolute 0 21 Thousand/µL      Basophils Absolute 0 05 Thousands/µL                  XR ankle 3+ views LEFT   ED Interpretation by Serapio Schilder, DO (08/21 1256)   No acute osseous abnormality  XR foot 3+ views LEFT   ED Interpretation by Serapio Schilder, DO (08/21 1256)   No acute osseous abnormality  Procedures  Procedures         ED Course  ED Course as of Aug 21 1408   Sat Aug 21, 2021   1405 Updated patient about normal x-rays and normal blood work and negative COVID swab  Most likely patient has ankle sprain  Ankle Aircast brace provided  Will give referral to Sports Medicine as well as to GI for diarrhea  Discussed ED return parameters  Discussed symptomatic management at home  SBIRT 22yo+      Most Recent Value   SBIRT (22 yo +)   In order to provide better care to our patients, we are screening all of our patients for alcohol and drug use  Would it be okay to ask you these screening questions? No Filed at: 08/21/2021 1138                    MDM  Number of Diagnoses or Management Options  Diagnosis management comments: 21-year-old male presents with left foot and ankle pain after injury 3 weeks ago  Most likely patient has ankle sprain but will obtain x-rays to rule out acute fracture  In regards to the diarrhea, this is isolated without abdominal pain or vomiting and could be secondary to infectious versus inflammatory colitis  Will check abdominal labs but I do not feel CT imaging is warranted given absence of pain and tenderness on exam   Will ultimately refer to GI for further evaluation and recommend Bentyl or Imodium as needed  Will give Toradol and apply ice for ankle pain         Amount and/or Complexity of Data Reviewed  Clinical lab tests: ordered and reviewed  Tests in the radiology section of CPT®: ordered and reviewed  Independent visualization of images, tracings, or specimens: yes        Disposition  Final diagnoses: Left ankle sprain   Diarrhea     Time reflects when diagnosis was documented in both MDM as applicable and the Disposition within this note     Time User Action Codes Description Comment    8/21/2021  2:05 PM Windy Solders Add [F75 295L] Left ankle sprain     8/21/2021  2:05 PM Sridhar HOPKINS Add [R19 7] Diarrhea       ED Disposition     ED Disposition Condition Date/Time Comment    Discharge Stable Sat Aug 21, 2021  2:05 PM Villa Summers discharge to home/self care              Follow-up Information     Follow up With Specialties Details Why Contact Info Additional Adalberto 34, DO Family Medicine Schedule an appointment as soon as possible for a visit   31 [facility] Jamey 55, DO Sports Medicine Schedule an appointment as soon as possible for a visit  for follow up of ankle sprain [address] [place] [address] [facility] Schedule an appointment as soon as possible for a visit   [address]  1121 [facility] [address]  [address] [place] [address] [facility] [address] [place], [place], [address]    5324 [facility] Emergency Department Emergency Medicine Go to  If symptoms worsen 100 34 [facility] 109 [facility] Emergency Department, 819 [place], [place], [address]          Patient's Medications   Discharge Prescriptions    IBUPROFEN (MOTRIN) 600 MG TABLET    Take 1 tablet (600 mg total) by mouth every 6 (six) hours as needed for mild pain or moderate pain       Start Date: 8/21/2021 End Date: --       Order Dose: 600 mg       Quantity: 30 tablet    Refills: 0    LOPERAMIDE (IMODIUM) 2 MG CAPSULE    Take 1 capsule (2 mg total) by mouth every 12 (twelve) hours as needed for diarrhea       Start Date: 8/21/2021 End Date: --       Order Dose: 2 mg       Quantity: 12 capsule    Refills: 0     No discharge procedures on file      PDMP Review     None          ED Provider  Electronically Signed by           Kota Sewell DO  08/21/21 7567

## 2021-09-16 ENCOUNTER — OFFICE VISIT (OUTPATIENT)
Dept: GASTROENTEROLOGY | Facility: CLINIC | Age: 31
End: 2021-09-16
Payer: COMMERCIAL

## 2021-09-16 VITALS
SYSTOLIC BLOOD PRESSURE: 130 MMHG | HEIGHT: 69 IN | HEART RATE: 65 BPM | DIASTOLIC BLOOD PRESSURE: 80 MMHG | BODY MASS INDEX: 27.05 KG/M2 | WEIGHT: 182.6 LBS

## 2021-09-16 DIAGNOSIS — R10.13 EPIGASTRIC PAIN: Primary | ICD-10-CM

## 2021-09-16 DIAGNOSIS — R19.7 DIARRHEA, UNSPECIFIED TYPE: ICD-10-CM

## 2021-09-16 PROCEDURE — 99203 OFFICE O/P NEW LOW 30 MIN: CPT | Performed by: PHYSICIAN ASSISTANT

## 2021-09-16 RX ORDER — MONTELUKAST SODIUM 4 MG/1
1 TABLET, CHEWABLE ORAL 2 TIMES DAILY
Qty: 60 TABLET | Refills: 3 | Status: SHIPPED | OUTPATIENT
Start: 2021-09-16

## 2021-09-16 RX ORDER — DICYCLOMINE HCL 20 MG
20 TABLET ORAL EVERY 6 HOURS PRN
Qty: 60 TABLET | Refills: 3 | Status: SHIPPED | OUTPATIENT
Start: 2021-09-16

## 2021-09-16 RX ORDER — PANTOPRAZOLE SODIUM 40 MG/1
40 TABLET, DELAYED RELEASE ORAL DAILY
Qty: 30 TABLET | Refills: 3 | Status: SHIPPED | OUTPATIENT
Start: 2021-09-16

## 2021-09-16 NOTE — PROGRESS NOTES
[facility] - Outpatient Consultation  José Acevedo 28 y o  male MRN: [id number]  Encounter: [id number]          ASSESSMENT AND PLAN:      1  Epigastric pain  2  Diarrhea, unspecified type  He reports 2 months of upper abdominal pain and diarrhea  Will plan EGD and colonoscopy  Will check US abdomen  Start pantoprazole, colestid and dicyclomine  Will check stool cx  ______________________________________________________________________    HPI:  23-year-old male presents for evaluation of abdominal pain and diarrhea  He reports that all the symptoms started about 2 months ago  Prior to this he had no gastrointestinal issues  He denies any new medications, antibiotics, travel or sick contacts prior to symptom onset  He reports that the symptoms are constant  He states that every time he eats he has to have a loose watery stool  He reports that eating does exacerbate the pain  There is no nausea or vomiting  He is eating normally  He reports a 5 lb weight loss over the past 2 months  He has had no fevers or chills  He did go to the emergency room in August with the symptoms  Labs were performed and noted to be normal   No imaging or further workup was done  He denies any family history of any gastrointestinal issues  REVIEW OF SYSTEMS:    CONSTITUTIONAL: Denies any fever, chills, rigors, and weight loss  HEENT: No earache or tinnitus  Denies hearing loss or visual disturbances  CARDIOVASCULAR: No chest pain or palpitations  RESPIRATORY: Denies any cough, hemoptysis, shortness of breath or dyspnea on exertion  GASTROINTESTINAL: As noted in the History of Present Illness  GENITOURINARY: No problems with urination  Denies any hematuria or dysuria  NEUROLOGIC: No dizziness or vertigo, denies headaches  MUSCULOSKELETAL: Denies any muscle or joint pain  SKIN: Denies skin rashes or itching     ENDOCRINE: Denies excessive thirst  Denies intolerance to heat or cold   PSYCHOSOCIAL: Denies depression or anxiety  Denies any recent memory loss  Historical Information   History reviewed  No pertinent past medical history  History reviewed  No pertinent surgical history  Social History   Social History     Substance and Sexual Activity   Alcohol Use Not Currently     Social History     Substance and Sexual Activity   Drug Use Not Currently     Social History     Tobacco Use   Smoking Status Never Smoker   Smokeless Tobacco Never Used     History reviewed  No pertinent family history  Meds/Allergies       Current Outpatient Medications:     Acetaminophen (TYLENOL PO)    ibuprofen (MOTRIN) 600 mg tablet    colestipol (COLESTID) 1 g tablet    dicyclomine (BENTYL) 20 mg tablet    pantoprazole (PROTONIX) 40 mg tablet    No Known Allergies        Objective     Blood pressure 130/80, pulse 65, height 5' 9" (1 753 m), weight 82 8 kg (182 lb 9 6 oz)  Body mass index is 26 97 kg/m²  PHYSICAL EXAM:      General Appearance:   Alert, cooperative, no distress   HEENT:   Normocephalic, atraumatic, anicteric      Neck:  Supple, symmetrical, trachea midline   Lungs:   Clear to auscultation bilaterally; no rales, rhonchi or wheezing; respirations unlabored    Heart[de-identified]   Regular rate and rhythm; no murmur, rub, or gallop  Abdomen:   Soft, non-tender, non-distended; normal bowel sounds; no masses, no organomegaly    Genitalia:   Deferred    Rectal:   Deferred    Extremities:  No cyanosis, clubbing or edema    Pulses:  2+ and symmetric    Skin:  No jaundice, rashes, or lesions    Lymph nodes:  No palpable cervical lymphadenopathy        Lab Results:   No visits with results within 1 Day(s) from this visit     Latest known visit with results is:   Admission on 08/21/2021, Discharged on 08/21/2021   Component Date Value    SARS-CoV-2 08/21/2021 Negative     Sodium 08/21/2021 141     Potassium 08/21/2021 4 1     Chloride 08/21/2021 104     CO2 08/21/2021 30     ANION GAP 08/21/2021 7     BUN 08/21/2021 10     Creatinine 08/21/2021 1 02     Glucose 08/21/2021 90     Calcium 08/21/2021 8 6     eGFR 08/21/2021 99     Total Bilirubin 08/21/2021 0 43     Bilirubin, Direct 08/21/2021 0 12     Alkaline Phosphatase 08/21/2021 64     AST 08/21/2021 19     ALT 08/21/2021 35     Total Protein 08/21/2021 8 2     Albumin 08/21/2021 4 6     WBC 08/21/2021 6 23     RBC 08/21/2021 5 06     Hemoglobin 08/21/2021 15 6     Hematocrit 08/21/2021 47 7     MCV 08/21/2021 94     MCH 08/21/2021 30 8     MCHC 08/21/2021 32 7     RDW 08/21/2021 12 7     MPV 08/21/2021 11 1     Platelets 72/34/1703 182     nRBC 08/21/2021 0     Neutrophils Relative 08/21/2021 47     Immat GRANS % 08/21/2021 0     Lymphocytes Relative 08/21/2021 40     Monocytes Relative 08/21/2021 9     Eosinophils Relative 08/21/2021 3     Basophils Relative 08/21/2021 1     Neutrophils Absolute 08/21/2021 2 92     Immature Grans Absolute 08/21/2021 0 02     Lymphocytes Absolute 08/21/2021 2 50     Monocytes Absolute 08/21/2021 0 53     Eosinophils Absolute 08/21/2021 0 21     Basophils Absolute 08/21/2021 0 05     Magnesium 08/21/2021 2 1     Lipase 08/21/2021 130          Radiology Results:   XR ankle 3+ views LEFT    Result Date: 8/22/2021  Narrative: LEFT ANKLE; LEFT FOOT INDICATION:   Ankle injury 3 weeks ago, still having pain to lateral ankle  COMPARISON:  None VIEWS:  XR ANKLE 3+ VW LEFT, XR FOOT 3+ VW LEFT Images: 6 FINDINGS: (Left ankle, left foot) There is no acute fracture or dislocation  No significant degenerative changes  No lytic or blastic osseous lesion  Soft tissues are unremarkable  Impression: Normal left ankle  Normal left foot  Workstation performed: [id number]     XR foot 3+ views LEFT    Result Date: 8/22/2021  Narrative: LEFT ANKLE; LEFT FOOT INDICATION:   Ankle injury 3 weeks ago, still having pain to lateral ankle   COMPARISON:  None VIEWS:  XR ANKLE 3+ VW LEFT, XR FOOT 3+ VW LEFT Images: 6 FINDINGS: (Left ankle, left foot) There is no acute fracture or dislocation  No significant degenerative changes  No lytic or blastic osseous lesion  Soft tissues are unremarkable  Impression: Normal left ankle  Normal left foot   Workstation performed: [id number]

## 2021-09-21 ENCOUNTER — TELEPHONE (OUTPATIENT)
Dept: GASTROENTEROLOGY | Facility: CLINIC | Age: 31
End: 2021-09-21

## 2021-09-21 NOTE — TELEPHONE ENCOUNTER
Dr Summer Barahona  - patient's wife, Tim Dawson, called wanted to know where to go to get stool test  Called [phone] lmom to  stool kit at any [facility]s Lab and return back to lab   If there are any further questions, to please call the office

## 2021-09-29 ENCOUNTER — TELEPHONE (OUTPATIENT)
Dept: GASTROENTEROLOGY | Facility: HOSPITAL | Age: 31
End: 2021-09-29

## 2021-11-02 ENCOUNTER — APPOINTMENT (EMERGENCY)
Dept: RADIOLOGY | Facility: HOSPITAL | Age: 31
End: 2021-11-02
Payer: COMMERCIAL

## 2021-11-02 ENCOUNTER — HOSPITAL ENCOUNTER (EMERGENCY)
Facility: HOSPITAL | Age: 31
Discharge: HOME | End: 2021-11-02
Attending: EMERGENCY MEDICINE | Admitting: EMERGENCY MEDICINE
Payer: COMMERCIAL

## 2021-11-02 VITALS
DIASTOLIC BLOOD PRESSURE: 78 MMHG | HEART RATE: 69 BPM | TEMPERATURE: 98 F | SYSTOLIC BLOOD PRESSURE: 131 MMHG | OXYGEN SATURATION: 98 % | RESPIRATION RATE: 18 BRPM

## 2021-11-02 DIAGNOSIS — S92.514A CLOSED NONDISPLACED FRACTURE OF PROXIMAL PHALANX OF LESSER TOE OF RIGHT FOOT, INITIAL ENCOUNTER: Primary | ICD-10-CM

## 2021-11-02 PROCEDURE — 99284 EMERGENCY DEPT VISIT MOD MDM: CPT | Performed by: SURGERY

## 2021-11-02 PROCEDURE — 99283 EMERGENCY DEPT VISIT LOW MDM: CPT

## 2021-11-02 PROCEDURE — 73630 X-RAY EXAM OF FOOT: CPT

## 2021-11-02 RX ORDER — NAPROXEN 250 MG/1
500 TABLET ORAL ONCE
Status: COMPLETED | OUTPATIENT
Start: 2021-11-02 | End: 2021-11-02

## 2021-11-02 RX ORDER — NAPROXEN 500 MG/1
500 TABLET ORAL 2 TIMES DAILY WITH MEALS
Qty: 10 TABLET | Refills: 0 | Status: SHIPPED | OUTPATIENT
Start: 2021-11-02 | End: 2021-11-07

## 2021-11-02 RX ADMIN — NAPROXEN 500 MG: 250 TABLET ORAL at 23:22

## 2022-11-09 ENCOUNTER — HOSPITAL ENCOUNTER (EMERGENCY)
Facility: HOSPITAL | Age: 32
Discharge: HOME | End: 2022-11-09
Attending: EMERGENCY MEDICINE

## 2022-11-09 VITALS
SYSTOLIC BLOOD PRESSURE: 154 MMHG | RESPIRATION RATE: 19 BRPM | DIASTOLIC BLOOD PRESSURE: 90 MMHG | TEMPERATURE: 98.9 F | HEART RATE: 96 BPM | OXYGEN SATURATION: 97 %

## 2022-11-09 DIAGNOSIS — J02.9 SORE THROAT: Primary | ICD-10-CM

## 2022-11-09 DIAGNOSIS — J02.0 STREP PHARYNGITIS: ICD-10-CM

## 2022-11-09 DIAGNOSIS — R11.10 VOMITING: ICD-10-CM

## 2022-11-09 LAB
FLUAV RNA RESP QL NAA+PROBE: NEGATIVE
FLUBV RNA RESP QL NAA+PROBE: NEGATIVE
RSV RNA RESP QL NAA+PROBE: NEGATIVE
S PYO DNA THROAT QL NAA+PROBE: DETECTED
SARS-COV-2 RNA RESP QL NAA+PROBE: NEGATIVE

## 2022-11-09 RX ORDER — ONDANSETRON 4 MG/1
4 TABLET, ORALLY DISINTEGRATING ORAL EVERY 6 HOURS PRN
Qty: 20 TABLET | Refills: 0 | Status: SHIPPED | OUTPATIENT
Start: 2022-11-09

## 2022-11-09 RX ORDER — AMOXICILLIN 500 MG/1
500 CAPSULE ORAL 3 TIMES DAILY
Qty: 30 CAPSULE | Refills: 0 | Status: SHIPPED | OUTPATIENT
Start: 2022-11-09 | End: 2022-11-19

## 2022-11-09 RX ORDER — FAMOTIDINE 20 MG/1
20 TABLET, FILM COATED ORAL 2 TIMES DAILY
Qty: 30 TABLET | Refills: 0 | Status: SHIPPED | OUTPATIENT
Start: 2022-11-09

## 2022-11-09 RX ORDER — ONDANSETRON 2 MG/ML
4 INJECTION INTRAMUSCULAR; INTRAVENOUS ONCE
Status: COMPLETED | OUTPATIENT
Start: 2022-11-09 | End: 2022-11-09

## 2022-11-09 RX ORDER — LIDOCAINE HYDROCHLORIDE 20 MG/ML
15 SOLUTION OROPHARYNGEAL 4 TIMES DAILY PRN
Qty: 100 ML | Refills: 0 | Status: SHIPPED | OUTPATIENT
Start: 2022-11-09

## 2022-11-09 RX ADMIN — SODIUM CHLORIDE 1000 ML: 0.9 INJECTION, SOLUTION INTRAVENOUS at 10:57

## 2022-11-09 RX ADMIN — ONDANSETRON 4 MG: 2 INJECTION INTRAMUSCULAR; INTRAVENOUS at 10:56

## 2022-11-09 RX ADMIN — DEXAMETHASONE SODIUM PHOSPHATE 10 MG: 10 INJECTION, SOLUTION INTRAMUSCULAR; INTRAVENOUS at 11:03

## 2022-11-09 NOTE — ED PROVIDER NOTES
History  Chief Complaint   Patient presents with   • Flu Symptoms     FEVER X 24 HOURS, VOMITTING, SORE THROAT, POOR APPETITE       History provided by:  Patient   used: No    Flu Symptoms      Prior to Admission Medications   Prescriptions Last Dose Informant Patient Reported? Taking? Acetaminophen (TYLENOL PO)  Self Yes No   Sig: Take by mouth    colestipol (COLESTID) 1 g tablet   No No   Sig: Take 1 tablet (1 g total) by mouth 2 (two) times a day   dicyclomine (BENTYL) 20 mg tablet   No No   Sig: Take 1 tablet (20 mg total) by mouth every 6 (six) hours as needed (abdominal pain)   ibuprofen (MOTRIN) 600 mg tablet  Self No No   Sig: Take 1 tablet (600 mg total) by mouth every 6 (six) hours as needed for mild pain or moderate pain   naproxen (Naprosyn) 500 mg tablet   No No   Sig: Take 1 tablet (500 mg total) by mouth 2 (two) times a day with meals for 5 days   pantoprazole (PROTONIX) 40 mg tablet   No No   Sig: Take 1 tablet (40 mg total) by mouth daily      Facility-Administered Medications: None       History reviewed  No pertinent past medical history  History reviewed  No pertinent surgical history  History reviewed  No pertinent family history  I have reviewed and agree with the history as documented  E-Cigarette/Vaping   • E-Cigarette Use Never User      E-Cigarette/Vaping Substances   • Nicotine No    • THC No    • CBD No    • Flavoring No    • Other No    • Unknown No      Social History     Tobacco Use   • Smoking status: Never Smoker   • Smokeless tobacco: Never Used   Vaping Use   • Vaping Use: Never used   Substance Use Topics   • Alcohol use:  Yes   • Drug use: Not Currently       Review of Systems    Physical Exam  Physical Exam    Vital Signs  ED Triage Vitals [11/09/22 0938]   Temperature Pulse Respirations Blood Pressure SpO2   98 9 °F (37 2 °C) 96 19 154/90 97 %      Temp Source Heart Rate Source Patient Position - Orthostatic VS BP Location FiO2 (%)   Oral -- -- -- --      Pain Score       9           Vitals:    11/09/22 0938   BP: 154/90   Pulse: 96         Visual Acuity      ED Medications  Medications - No data to display    Diagnostic Studies  Results Reviewed     Procedure Component Value Units Date/Time    COVID/FLU/RSV [[id number]]  (Normal) Collected: 11/09/22 0938    Lab Status: Final result Specimen: Nares from Nose Updated: 11/09/22 1027     SARS-CoV-2 Negative     INFLUENZA A PCR Negative     INFLUENZA B PCR Negative     RSV PCR Negative    Narrative:      FOR PEDIATRIC PATIENTS - copy/paste COVID Guidelines URL to browser: [email or web address]/  ViewsIQ    SARS-CoV-2 assay is a Nucleic Acid Amplification assay intended for the  qualitative detection of nucleic acid from SARS-CoV-2 in nasopharyngeal  swabs  Results are for the presumptive identification of SARS-CoV-2 RNA  Positive results are indicative of infection with SARS-CoV-2, the virus  causing COVID-19, but do not rule out bacterial infection or co-infection  with other viruses  Laboratories within the [place] and its  territories are required to report all positive results to the appropriate  public health authorities  Negative results do not preclude SARS-CoV-2  infection and should not be used as the sole basis for treatment or other  patient management decisions  Negative results must be combined with  clinical observations, patient history, and epidemiological information  This test has not been FDA cleared or approved  This test has been authorized by FDA under an Emergency Use Authorization  (EUA)  This test is only authorized for the duration of time the  declaration that circumstances exist justifying the authorization of the  emergency use of an in vitro diagnostic tests for detection of SARS-CoV-2  virus and/or diagnosis of COVID-19 infection under section 564(b)(1) of  the Act, 21 U  S C  686JMS-3(R)(5), unless the authorization is terminated  or revoked sooner  The test has been validated but independent review by FDA  and CLIA is pending  Test performed using [employer] GeneXpert: This RT-PCR assay targets N2,  a region unique to SARS-CoV-2  A conserved region in the E-gene was chosen  for pan-Sarbecovirus detection which includes SARS-CoV-2  According to CMS-2020-01-R, this platform meets the definition of high-throughput technology  No orders to display              Procedures  Procedures         ED Course                               SBIRT 20yo+    Flowsheet Row Most Recent Value   SBIRT (25 yo +)    In order to provide better care to our patients, we are screening all of our patients for alcohol and drug use  Would it be okay to ask you these screening questions? Yes Filed at: 11/09/2022 1008   Initial Alcohol Screen: US AUDIT-C     1  How often do you have a drink containing alcohol? 0 Filed at: 11/09/2022 1008   2  How many drinks containing alcohol do you have on a typical day you are drinking? 0 Filed at: 11/09/2022 1008   3a  Male UNDER 65: How often do you have five or more drinks on one occasion? 0 Filed at: 11/09/2022 1008   Audit-C Score 0 Filed at: 11/09/2022 1008   SUSAN: How many times in the past year have you    Used an illegal drug or used a prescription medication for non-medical reasons? Never Filed at: 11/09/2022 1008                    MDM    Disposition  Final diagnoses:   None     ED Disposition     None      Follow-up Information    None         Patient's Medications   Discharge Prescriptions    No medications on file       No discharge procedures on file      PDMP Review     None          ED Provider  Electronically Signed by -- --      Pain Score       9           Vitals:    11/09/22 0938   BP: 154/90   Pulse: 96         Visual Acuity      ED Medications  Medications   sodium chloride 0 9 % bolus 1,000 mL (0 mL Intravenous Stopped 11/9/22 1246)   ondansetron (ZOFRAN) injection 4 mg (4 mg Intravenous Given 11/9/22 1056)   dexamethasone oral liquid 10 mg 1 mL (10 mg Oral Given 11/9/22 1103)       Diagnostic Studies  Results Reviewed     Procedure Component Value Units Date/Time    Mononucleosis screen [[id number]]  (Normal) Collected: 11/09/22 1103    Lab Status: Final result Specimen: Blood from Arm, Left Updated: 11/10/22 1112     Monotest Negative    Strep A PCR [[id number]]  (Abnormal) Collected: 11/09/22 1103    Lab Status: Final result Specimen: Throat Updated: 11/09/22 1236     STREP A PCR Detected    COVID/FLU/RSV [[id number]]  (Normal) Collected: 11/09/22 0938    Lab Status: Final result Specimen: Nares from Nose Updated: 11/09/22 1027     SARS-CoV-2 Negative     INFLUENZA A PCR Negative     INFLUENZA B PCR Negative     RSV PCR Negative    Narrative:      FOR PEDIATRIC PATIENTS - copy/paste COVID Guidelines URL to browser: [email or web address]  [employer]    SARS-CoV-2 assay is a Nucleic Acid Amplification assay intended for the  qualitative detection of nucleic acid from SARS-CoV-2 in nasopharyngeal  swabs  Results are for the presumptive identification of SARS-CoV-2 RNA  Positive results are indicative of infection with SARS-CoV-2, the virus  causing COVID-19, but do not rule out bacterial infection or co-infection  with other viruses  Laboratories within the [place] and its  territories are required to report all positive results to the appropriate  public health authorities  Negative results do not preclude SARS-CoV-2  infection and should not be used as the sole basis for treatment or other  patient management decisions   Negative results must be combined with  clinical observations, patient history, and epidemiological information  This test has not been FDA cleared or approved  This test has been authorized by FDA under an Emergency Use Authorization  (EUA)  This test is only authorized for the duration of time the  declaration that circumstances exist justifying the authorization of the  emergency use of an in vitro diagnostic tests for detection of SARS-CoV-2  virus and/or diagnosis of COVID-19 infection under section 564(b)(1) of  the Act, 21 U  S C  092EZW-4(U)(2), unless the authorization is terminated  or revoked sooner  The test has been validated but independent review by FDA  and CLIA is pending  Test performed using [employer] GeneXpert: This RT-PCR assay targets N2,  a region unique to SARS-CoV-2  A conserved region in the E-gene was chosen  for pan-Sarbecovirus detection which includes SARS-CoV-2  According to CMS-2020-01-R, this platform meets the definition of high-throughput technology  No orders to display              Procedures  Procedures         ED Course                               SBIRT 20yo+    Flowsheet Row Most Recent Value   SBIRT (23 yo +)    In order to provide better care to our patients, we are screening all of our patients for alcohol and drug use  Would it be okay to ask you these screening questions? Yes Filed at: 11/09/2022 1008   Initial Alcohol Screen: US AUDIT-C     1  How often do you have a drink containing alcohol? 0 Filed at: 11/09/2022 1008   2  How many drinks containing alcohol do you have on a typical day you are drinking? 0 Filed at: 11/09/2022 1008   3a  Male UNDER 65: How often do you have five or more drinks on one occasion? 0 Filed at: 11/09/2022 1008   Audit-C Score 0 Filed at: 11/09/2022 1008   SUSAN: How many times in the past year have you    Used an illegal drug or used a prescription medication for non-medical reasons?  Never Filed at: 11/09/2022 1008                    MDM  Number of Diagnoses or Management Options  Sore throat: new and requires workup  Strep pharyngitis: new and requires workup  Vomiting: new and requires workup  Diagnosis management comments: MDM  ddx includes but is not limited to:  URI, RSV, sinusitis, OE, OM, serous otitis media, strep pharyngitis,  flu, allergies, viral syndrome, asthma, bronchitis, pneumonia, consider but doubt interstitial lung disease, pertussis  ED plan: check flu, strep, covid testing  ED results:   I have reviewed the patient's vital signs, nursing notes, and other relevant ancillary testing/information  I have had a detailed discussion with the patient regarding the historical points, examination findings, and any diagnostic results  Relevant results include negative covid and flu, positive strep, mono pending  ED coarse: 33year old male presents with 24 hours of flu like symptoms  No respiratory distress or clinical signs of dehydration  Received iv fluids and in ED and felt better  Strep positive  Discussed diagnosis of strep pharyngitis and expectant coarse of disease  Will treat with amoxicillin to cover strep  Discussed use of NSAIDS/APAP for body aches/fever/pain  Add zofran for vomiting and viscous lidocaine for throat pain  Encourage fluids  No drooling, trismus or vocal changes to suggest PTA or RPA  Able to tolerate PO intake here in ed  I discussed diagnosis and treatment plan with patient at bedside  Extended discussion with patient regarding the diagnosis, pathophysiology, expectant coarse and treatment plan  Instructed to follow up with pcp and recommended specialist (ENT) in 3-5 days  Reviewed reasons to return to ed  Patient verbalized understanding of diagnosis and agreement with discharge plan of care as well as understanding of reasons to return to ed               Amount and/or Complexity of Data Reviewed  Clinical lab tests: ordered and reviewed  Discussion of test results with the performing providers: yes    Risk of Complications, Morbidity, and/or Mortality  General comments: Disclaimers:    I have reasonably determine that electronically prescribing a controlled substance would be impractical for the patient to obtain the controlled substance prescribed by electronic prescription or would cause an untimely delay resulting in an adverse impact on the patient's medical condition        Patient was seen during the outbreak of the corona virus epidemic   Resources are limited due to the severity of patient illnesses associated with virus   Testing is also limited at this time   Discussed with patient at the time of this evaluation   Due to the fact that limited resources are available -treatment options are limited  Patient Progress  Patient progress: stable      Disposition  Final diagnoses:   Sore throat   Vomiting   Strep pharyngitis     Time reflects when diagnosis was documented in both MDM as applicable and the Disposition within this note     Time User Action Codes Description Comment    11/9/2022 10:41 AM Betzaida Grater Add [J02 9] Sore throat     11/9/2022 10:41 AM Betzaida Grater Add [R11 10] Vomiting     11/9/2022 12:40 PM Betzaida Grater Add [J02 0] Strep pharyngitis       ED Disposition     ED Disposition   Discharge    Condition   Stable    Date/Time   Wed Nov 9, 2022 10:40 AM    Comment   Buzz Garcia discharge to home/self care                 Follow-up Information     Follow up With Specialties Details Why Contact Info Additional [address], DO Family Medicine Call in 2 days for further evaluation of symptoms [address] [facility] Emergency Department Emergency Medicine Go to  If symptoms worsen [address] [facility], [address] [place] [address], [facility], [address]  Throat Otolaryngology Call in 1 week for further evaluation of symptoms [id number] [facility], [address]  [facility], [address]          Discharge Medication List as of 11/9/2022 12:41 PM      START taking these medications    Details   amoxicillin (AMOXIL) 500 mg capsule Take 1 capsule (500 mg total) by mouth 3 (three) times a day for 10 days, Starting Wed 11/9/2022, Until Sat 11/19/2022, Normal      famotidine (PEPCID) 20 mg tablet Take 1 tablet (20 mg total) by mouth 2 (two) times a day, Starting Wed 11/9/2022, Normal      Lidocaine Viscous HCl (XYLOCAINE) 2 % mucosal solution Swish and spit 15 mL 4 (four) times a day as needed (throat pain), Starting Wed 11/9/2022, Normal      ondansetron (ZOFRAN-ODT) 4 mg disintegrating tablet Take 1 tablet (4 mg total) by mouth every 6 (six) hours as needed for nausea or vomiting for up to 20 doses, Starting Wed 11/9/2022, Normal         CONTINUE these medications which have NOT CHANGED    Details   Acetaminophen (TYLENOL PO) Take by mouth , Historical Med      colestipol (COLESTID) 1 g tablet Take 1 tablet (1 g total) by mouth 2 (two) times a day, Starting Thu 9/16/2021, Normal      dicyclomine (BENTYL) 20 mg tablet Take 1 tablet (20 mg total) by mouth every 6 (six) hours as needed (abdominal pain), Starting Thu 9/16/2021, Normal      ibuprofen (MOTRIN) 600 mg tablet Take 1 tablet (600 mg total) by mouth every 6 (six) hours as needed for mild pain or moderate pain, Starting Sat 8/21/2021, Print      naproxen (Naprosyn) 500 mg tablet Take 1 tablet (500 mg total) by mouth 2 (two) times a day with meals for 5 days, Starting Tue 11/2/2021, Until Sun 11/7/2021, Print      pantoprazole (PROTONIX) 40 mg tablet Take 1 tablet (40 mg total) by mouth daily, Starting Thu 9/16/2021, Normal             No discharge procedures on file      PDMP Review     None          ED Provider  Electronically Signed by Briana Hernández PA-C  11/19/22 2005

## 2022-11-10 LAB — HETEROPH AB SER QL: NEGATIVE
